# Patient Record
Sex: FEMALE | URBAN - METROPOLITAN AREA
[De-identification: names, ages, dates, MRNs, and addresses within clinical notes are randomized per-mention and may not be internally consistent; named-entity substitution may affect disease eponyms.]

---

## 2022-02-02 ENCOUNTER — OFFICE VISIT (OUTPATIENT)
Dept: URBAN - METROPOLITAN AREA CLINIC 33 | Facility: CLINIC | Age: 22
End: 2022-02-02

## 2022-02-02 ENCOUNTER — TELEPHONE ENCOUNTER (OUTPATIENT)
Dept: URBAN - METROPOLITAN AREA CLINIC 33 | Facility: CLINIC | Age: 22
End: 2022-02-02

## 2022-02-02 NOTE — HPI-ANEMIA
Patient was first diagnosed (when) by ____, and the most recent labs were taken ___.  Patient currently states that they are/are not taking any iron supplements and that they have/have not had a transfusion.  Patient denies/admits NSAID use, a history of GI bleeding, any blood or melena in stools, epigastric/abdominal pains, fatigue, cold extremities, light headedness, dizziness.  Patient states that they have/have not had an EGD before.  (EGD was performed by  ___ on __ and ___ was found.)

## 2023-07-14 ENCOUNTER — OFFICE VISIT (OUTPATIENT)
Dept: URBAN - METROPOLITAN AREA CLINIC 33 | Facility: CLINIC | Age: 23
End: 2023-07-14
Payer: COMMERCIAL

## 2023-07-14 VITALS
HEART RATE: 94 BPM | WEIGHT: 137 LBS | HEIGHT: 62 IN | BODY MASS INDEX: 25.21 KG/M2 | OXYGEN SATURATION: 99 % | DIASTOLIC BLOOD PRESSURE: 80 MMHG | SYSTOLIC BLOOD PRESSURE: 110 MMHG

## 2023-07-14 DIAGNOSIS — R10.13 DYSPEPSIA: ICD-10-CM

## 2023-07-14 DIAGNOSIS — E61.1 IRON DEFICIENCY: ICD-10-CM

## 2023-07-14 PROCEDURE — 99203 OFFICE O/P NEW LOW 30 MIN: CPT | Performed by: STUDENT IN AN ORGANIZED HEALTH CARE EDUCATION/TRAINING PROGRAM

## 2023-07-14 RX ORDER — LEVOTHYROXINE SODIUM 112 UG/1
1 TABLET IN THE MORNING ON AN EMPTY STOMACH TABLET ORAL ONCE A DAY
Status: ACTIVE | COMMUNITY

## 2023-07-14 RX ORDER — OMEPRAZOLE 40 MG/1
1 CAPSULE 30 MINUTES BEFORE MORNING MEAL CAPSULE, DELAYED RELEASE ORAL ONCE A DAY
Qty: 30 | Refills: 2 | OUTPATIENT
Start: 2023-07-14

## 2023-07-14 RX ORDER — SIMETHICONE 80 MG/1
1 TABLET AFTER MEALS AND AT BEDTIME AS NEEDED TABLET, CHEWABLE ORAL
Status: ACTIVE | COMMUNITY

## 2023-07-14 RX ORDER — SUCRALFATE 1 G/1
1 TABLET ON AN EMPTY STOMACH TABLET ORAL THREE TIMES A DAY
Qty: 90 TABLET | Status: ACTIVE | COMMUNITY

## 2023-07-14 RX ORDER — SUCRALFATE 1 G/1
1 TABLET ON AN EMPTY STOMACH TABLET ORAL THREE TIMES A DAY
OUTPATIENT

## 2023-07-14 RX ORDER — IRON FUM,PS/FOLIC/BCOMP,C NO.9 125 MG-1MG
1 CAPSULE CAPSULE ORAL ONCE A DAY
Qty: 90 CAPSULE | Status: ACTIVE | COMMUNITY

## 2023-07-14 NOTE — HPI-TODAY'S VISIT:
22 year old new female presents today for a consultation of abdominal pain/discomfort. Reports around end of May,having acid reflux symptoms after eating, with discomfort in chest. Reports after came back from [place] having epigastric pain, with burning symptosm. Symptoms generally from sternum to belly button. Reports dull pain persists. Took prilosec for a week, (2 week ago). Reports prilosec helped. Went to PCP, taking sucralfate now. Taking ibuprofen during menses 3 tablets for 1-2 days. Nausea, no emesis. Has done gluten free for a month.  Reports celiac test a year ago that neg but feels sensitive. Has BM 2-3 somewhat formed. No blood. Hx heaver menses, and painful. Reports last Hb normal, but ferritin low. Tolerating new iron formulation better than prior.   RUQ US 7-2-23 No gallstones, CBD normal No focal liver lesion.   No history of previous EGD

## 2023-07-21 ENCOUNTER — LAB OUTSIDE AN ENCOUNTER (OUTPATIENT)
Dept: URBAN - METROPOLITAN AREA CLINIC 35 | Facility: CLINIC | Age: 23
End: 2023-07-21

## 2023-07-24 ENCOUNTER — OFFICE VISIT (OUTPATIENT)
Dept: URBAN - METROPOLITAN AREA CLINIC 33 | Facility: CLINIC | Age: 23
End: 2023-07-24

## 2023-07-25 LAB — HELICOBACTER PYLORI AG, EIA, STOOL: (no result)

## 2023-09-15 ENCOUNTER — OFFICE VISIT (OUTPATIENT)
Dept: URBAN - METROPOLITAN AREA CLINIC 33 | Facility: CLINIC | Age: 23
End: 2023-09-15
Payer: COMMERCIAL

## 2023-09-15 ENCOUNTER — DASHBOARD ENCOUNTERS (OUTPATIENT)
Age: 23
End: 2023-09-15

## 2023-09-15 VITALS
DIASTOLIC BLOOD PRESSURE: 80 MMHG | BODY MASS INDEX: 25.21 KG/M2 | HEIGHT: 62 IN | OXYGEN SATURATION: 99 % | HEART RATE: 93 BPM | WEIGHT: 137 LBS | SYSTOLIC BLOOD PRESSURE: 120 MMHG

## 2023-09-15 DIAGNOSIS — R10.13 DYSPEPSIA: ICD-10-CM

## 2023-09-15 PROCEDURE — 99213 OFFICE O/P EST LOW 20 MIN: CPT | Performed by: STUDENT IN AN ORGANIZED HEALTH CARE EDUCATION/TRAINING PROGRAM

## 2023-09-15 RX ORDER — IRON FUM,PS/FOLIC/BCOMP,C NO.9 125 MG-1MG
1 CAPSULE CAPSULE ORAL ONCE A DAY
Qty: 90 CAPSULE | Status: ACTIVE | COMMUNITY

## 2023-09-15 RX ORDER — OMEPRAZOLE 20 MG/1
1 CAPSULE 30 MINUTES BEFORE MORNING MEAL CAPSULE, DELAYED RELEASE ORAL ONCE A DAY
Qty: 30 | Refills: 1 | OUTPATIENT
Start: 2023-09-15

## 2023-09-15 RX ORDER — LEVOTHYROXINE SODIUM 125 UG/1
1 TABLET IN THE MORNING ON AN EMPTY STOMACH TABLET ORAL ONCE A DAY
Status: ACTIVE | COMMUNITY

## 2023-09-15 RX ORDER — SIMETHICONE 80 MG/1
1 TABLET AFTER MEALS AND AT BEDTIME AS NEEDED TABLET, CHEWABLE ORAL
Status: ON HOLD | COMMUNITY

## 2023-09-15 RX ORDER — OMEPRAZOLE 40 MG/1
1 CAPSULE 30 MINUTES BEFORE MORNING MEAL CAPSULE, DELAYED RELEASE ORAL ONCE A DAY
Qty: 30 | Refills: 2 | Status: ACTIVE | COMMUNITY
Start: 2023-07-14

## 2023-09-15 NOTE — HPI-TODAY'S VISIT:
22 Year old female patient present today for follow-up of dyspepsia, H.pylori testing. H.pylori test is negative. Has been taking omeprazole at night. Reports dyspepsia symptoms significantly improved. Eating. Appetite came back, weight stable. No nausea, or vomitting.

## 2023-09-18 ENCOUNTER — OFFICE VISIT (OUTPATIENT)
Dept: URBAN - METROPOLITAN AREA CLINIC 78 | Facility: CLINIC | Age: 23
End: 2023-09-18

## 2023-11-10 ENCOUNTER — OFFICE VISIT (OUTPATIENT)
Dept: URBAN - METROPOLITAN AREA CLINIC 33 | Facility: CLINIC | Age: 23
End: 2023-11-10